# Patient Record
Sex: FEMALE | Race: BLACK OR AFRICAN AMERICAN
[De-identification: names, ages, dates, MRNs, and addresses within clinical notes are randomized per-mention and may not be internally consistent; named-entity substitution may affect disease eponyms.]

---

## 2022-05-24 ENCOUNTER — HOSPITAL ENCOUNTER (OUTPATIENT)
Dept: HOSPITAL 49 - FAS | Age: 57
LOS: 1 days | Discharge: HOME | End: 2022-05-25
Attending: ORTHOPAEDIC SURGERY
Payer: COMMERCIAL

## 2022-05-24 VITALS — HEIGHT: 68.9 IN | WEIGHT: 273.37 LBS | BODY MASS INDEX: 40.49 KG/M2

## 2022-05-24 DIAGNOSIS — M17.11: Primary | ICD-10-CM

## 2022-05-24 DIAGNOSIS — Z88.0: ICD-10-CM

## 2022-05-24 DIAGNOSIS — R01.1: ICD-10-CM

## 2022-05-24 DIAGNOSIS — I10: ICD-10-CM

## 2022-05-24 DIAGNOSIS — Z79.899: ICD-10-CM

## 2022-05-24 DIAGNOSIS — F32.A: ICD-10-CM

## 2022-05-24 PROCEDURE — C1713 ANCHOR/SCREW BN/BN,TIS/BN: HCPCS

## 2022-05-24 PROCEDURE — C1776 JOINT DEVICE (IMPLANTABLE): HCPCS

## 2022-05-24 NOTE — NUR
PT REQUESTED [facility] FOR [facility].  FIRST APPT IS THURSDAY,
5/26/22 @ 8:00 AM
[facility] TO DELIVER A RW. CHOICE FORM SIGNED AND COPY GIVEN.

## 2022-05-25 LAB
BASOPHIL: 0.2 % (ref 0–2)
BUN SERPL-MCNC: 19 MG/DL (ref 7–18)
BUN/CREAT RATIO (CALC): 19.8 RATIO
CHLORIDE: 103 MMOL/L (ref 98–107)
CO2 (BICARBONATE): 24 MMOL/L (ref 21–32)
CREATININE: 0.96 MG/DL (ref 0.51–0.95)
EOSINOPHIL: 0 % (ref 0–5)
GLUCOSE SERPL-MCNC: 128 MG/DL (ref 74–106)
HCT: 36.5 % (ref 37–47)
HGB BLD-MCNC: 11.8 G/DL (ref 12.5–16)
LYMPHOCYTE: 12.5 % (ref 15–48)
MCH RBC QN AUTO: 31.6 PG (ref 25–31)
MCHC RBC AUTO-ENTMCNC: 32.3 G/DL (ref 32–36)
MCV: 97.9 FL (ref 78–100)
MONOCYTE: 12.5 % (ref 0–12)
MPV: 11.1 FL (ref 6–9.5)
NEUTROPHIL: 74.5 % (ref 41–80)
NRBC: 0
PLT: 232 K/UL (ref 150–400)
POTASSIUM: 4.3 MMOL/L (ref 3.5–5.1)
RBC MORPHOLOGY: NORMAL
RBC: 3.73 M/UL (ref 4.2–5.4)
RDW: 13 % (ref 11.5–14)
WBC: 9.8 K/UL (ref 4–10.5)